# Patient Record
Sex: FEMALE | Race: WHITE | NOT HISPANIC OR LATINO
[De-identification: names, ages, dates, MRNs, and addresses within clinical notes are randomized per-mention and may not be internally consistent; named-entity substitution may affect disease eponyms.]

---

## 2017-12-11 ENCOUNTER — TRANSCRIPTION ENCOUNTER (OUTPATIENT)
Age: 22
End: 2017-12-11

## 2017-12-12 ENCOUNTER — APPOINTMENT (OUTPATIENT)
Dept: SURGERY | Facility: CLINIC | Age: 22
End: 2017-12-12
Payer: COMMERCIAL

## 2017-12-12 VITALS
BODY MASS INDEX: 20.24 KG/M2 | WEIGHT: 110 LBS | DIASTOLIC BLOOD PRESSURE: 78 MMHG | HEIGHT: 62 IN | SYSTOLIC BLOOD PRESSURE: 127 MMHG | HEART RATE: 74 BPM

## 2017-12-12 DIAGNOSIS — Z78.9 OTHER SPECIFIED HEALTH STATUS: ICD-10-CM

## 2017-12-12 DIAGNOSIS — Z80.2 FAMILY HISTORY OF MALIGNANT NEOPLASM OF OTHER RESPIRATORY AND INTRATHORACIC ORGANS: ICD-10-CM

## 2017-12-12 DIAGNOSIS — F41.9 ANXIETY DISORDER, UNSPECIFIED: ICD-10-CM

## 2017-12-12 PROCEDURE — 99244 OFF/OP CNSLTJ NEW/EST MOD 40: CPT

## 2017-12-12 RX ORDER — DULOXETINE HYDROCHLORIDE 20 MG/1
20 CAPSULE, DELAYED RELEASE PELLETS ORAL
Refills: 0 | Status: ACTIVE | COMMUNITY

## 2017-12-13 ENCOUNTER — OUTPATIENT (OUTPATIENT)
Dept: OUTPATIENT SERVICES | Facility: HOSPITAL | Age: 22
LOS: 1 days | End: 2017-12-13

## 2017-12-13 VITALS
TEMPERATURE: 98 F | SYSTOLIC BLOOD PRESSURE: 98 MMHG | WEIGHT: 106.04 LBS | HEART RATE: 78 BPM | HEIGHT: 61.5 IN | DIASTOLIC BLOOD PRESSURE: 70 MMHG | RESPIRATION RATE: 16 BRPM

## 2017-12-13 DIAGNOSIS — E04.1 NONTOXIC SINGLE THYROID NODULE: ICD-10-CM

## 2017-12-13 DIAGNOSIS — L42 PITYRIASIS ROSEA: ICD-10-CM

## 2017-12-13 DIAGNOSIS — N39.0 URINARY TRACT INFECTION, SITE NOT SPECIFIED: ICD-10-CM

## 2017-12-13 LAB
HCT VFR BLD CALC: 42.7 % — SIGNIFICANT CHANGE UP (ref 34.5–45)
HGB BLD-MCNC: 14.3 G/DL — SIGNIFICANT CHANGE UP (ref 11.5–15.5)
MCHC RBC-ENTMCNC: 30 PG — SIGNIFICANT CHANGE UP (ref 27–34)
MCHC RBC-ENTMCNC: 33.5 % — SIGNIFICANT CHANGE UP (ref 32–36)
MCV RBC AUTO: 89.5 FL — SIGNIFICANT CHANGE UP (ref 80–100)
NRBC # FLD: 0 — SIGNIFICANT CHANGE UP
PLATELET # BLD AUTO: 246 K/UL — SIGNIFICANT CHANGE UP (ref 150–400)
PMV BLD: 10.4 FL — SIGNIFICANT CHANGE UP (ref 7–13)
RBC # BLD: 4.77 M/UL — SIGNIFICANT CHANGE UP (ref 3.8–5.2)
RBC # FLD: 12.9 % — SIGNIFICANT CHANGE UP (ref 10.3–14.5)
WBC # BLD: 5.83 K/UL — SIGNIFICANT CHANGE UP (ref 3.8–10.5)
WBC # FLD AUTO: 5.83 K/UL — SIGNIFICANT CHANGE UP (ref 3.8–10.5)

## 2017-12-13 RX ORDER — SODIUM CHLORIDE 9 MG/ML
1000 INJECTION, SOLUTION INTRAVENOUS
Qty: 0 | Refills: 0 | Status: DISCONTINUED | OUTPATIENT
Start: 2017-12-18 | End: 2017-12-19

## 2017-12-13 NOTE — H&P PST ADULT - NSANTHOSAYNRD_GEN_A_CORE
No. DANI screening performed.  STOP BANG Legend: 0-2 = LOW Risk; 3-4 = INTERMEDIATE Risk; 5-8 = HIGH Risk

## 2017-12-13 NOTE — H&P PST ADULT - PROBLEM SELECTOR PLAN 1
Scheduled for Left thyroid lobectomy possible total on 12/18/2017.  Preop instructions provided and pt verbalizes understanding.  Labs done and results pending.  Famotidine and Hibiclens provided with instructions.  Urine specimen cup provided.

## 2017-12-13 NOTE — H&P PST ADULT - HISTORY OF PRESENT ILLNESS
22yr old female with preop dx of nontoxic single thyroid nodule presents to have PST eval for Left thyroid lobectomy possible total scheduled on 12/18/2017.

## 2017-12-13 NOTE — H&P PST ADULT - FAMILY HISTORY
Mother  Still living? Yes, Estimated age: 51-60  Family history of diabetes mellitus, Age at diagnosis: Age Unknown

## 2017-12-13 NOTE — H&P PST ADULT - NEGATIVE ALLERGY TYPES
no reactions to food/no indoor environmental allergies/no reactions to medicines/no reactions to animals

## 2017-12-14 ENCOUNTER — RESULT REVIEW (OUTPATIENT)
Age: 22
End: 2017-12-14

## 2017-12-18 ENCOUNTER — OTHER (OUTPATIENT)
Age: 22
End: 2017-12-18

## 2017-12-18 ENCOUNTER — OUTPATIENT (OUTPATIENT)
Dept: OUTPATIENT SERVICES | Facility: HOSPITAL | Age: 22
LOS: 1 days | Discharge: HOME | End: 2017-12-18
Payer: COMMERCIAL

## 2017-12-18 ENCOUNTER — APPOINTMENT (OUTPATIENT)
Dept: SURGERY | Facility: HOSPITAL | Age: 22
End: 2017-12-18

## 2017-12-18 ENCOUNTER — RESULT REVIEW (OUTPATIENT)
Age: 22
End: 2017-12-18

## 2017-12-18 ENCOUNTER — TRANSCRIPTION ENCOUNTER (OUTPATIENT)
Age: 22
End: 2017-12-18

## 2017-12-18 VITALS
SYSTOLIC BLOOD PRESSURE: 117 MMHG | HEART RATE: 89 BPM | DIASTOLIC BLOOD PRESSURE: 75 MMHG | TEMPERATURE: 98 F | RESPIRATION RATE: 15 BRPM | OXYGEN SATURATION: 98 %

## 2017-12-18 VITALS
WEIGHT: 106.04 LBS | DIASTOLIC BLOOD PRESSURE: 80 MMHG | HEART RATE: 78 BPM | HEIGHT: 61.5 IN | TEMPERATURE: 99 F | OXYGEN SATURATION: 99 % | SYSTOLIC BLOOD PRESSURE: 126 MMHG | RESPIRATION RATE: 14 BRPM

## 2017-12-18 DIAGNOSIS — E04.1 NONTOXIC SINGLE THYROID NODULE: ICD-10-CM

## 2017-12-18 PROCEDURE — 88334 PATH CONSLTJ SURG CYTO XM EA: CPT | Mod: 26,59

## 2017-12-18 PROCEDURE — 88331 PATH CONSLTJ SURG 1 BLK 1SPC: CPT | Mod: 26

## 2017-12-18 PROCEDURE — 88305 TISSUE EXAM BY PATHOLOGIST: CPT | Mod: 26

## 2017-12-18 PROCEDURE — 88307 TISSUE EXAM BY PATHOLOGIST: CPT | Mod: 26

## 2017-12-18 PROCEDURE — 13132 CMPLX RPR F/C/C/M/N/AX/G/H/F: CPT | Mod: 59

## 2017-12-18 PROCEDURE — 60252 REMOVAL OF THYROID: CPT | Mod: AS

## 2017-12-18 PROCEDURE — 60252 REMOVAL OF THYROID: CPT

## 2017-12-18 RX ORDER — AZTREONAM 2 G
1 VIAL (EA) INJECTION
Qty: 0 | Refills: 0 | COMMUNITY

## 2017-12-18 RX ORDER — NORGESTIMATE AND ETHINYL ESTRADIOL 7DAYSX3 LO
1 KIT ORAL
Qty: 0 | Refills: 0 | COMMUNITY

## 2017-12-18 RX ORDER — ACETAMINOPHEN 500 MG
2 TABLET ORAL
Qty: 0 | Refills: 0 | COMMUNITY
Start: 2017-12-18

## 2017-12-18 RX ORDER — OXYCODONE AND ACETAMINOPHEN 5; 325 MG/1; MG/1
1 TABLET ORAL EVERY 4 HOURS
Qty: 0 | Refills: 0 | Status: DISCONTINUED | OUTPATIENT
Start: 2017-12-18 | End: 2017-12-19

## 2017-12-18 RX ORDER — DULOXETINE HYDROCHLORIDE 30 MG/1
1 CAPSULE, DELAYED RELEASE ORAL
Qty: 0 | Refills: 0 | COMMUNITY

## 2017-12-18 RX ORDER — ACETAMINOPHEN 500 MG
650 TABLET ORAL EVERY 6 HOURS
Qty: 0 | Refills: 0 | Status: DISCONTINUED | OUTPATIENT
Start: 2017-12-18 | End: 2017-12-19

## 2017-12-18 RX ADMIN — SODIUM CHLORIDE 30 MILLILITER(S): 9 INJECTION, SOLUTION INTRAVENOUS at 10:18

## 2017-12-18 RX ADMIN — Medication 650 MILLIGRAM(S): at 17:09

## 2017-12-18 RX ADMIN — SODIUM CHLORIDE 50 MILLILITER(S): 9 INJECTION, SOLUTION INTRAVENOUS at 16:00

## 2017-12-18 NOTE — BRIEF OPERATIVE NOTE - PROCEDURE
<<-----Click on this checkbox to enter Procedure Left thyroid lobectomy  12/18/2017    Active  KTORTORELL

## 2017-12-18 NOTE — ASU DISCHARGE PLAN (ADULT/PEDIATRIC). - COMMENTS
Surgical Unit will call you on the next business day to follow up. [facility] [place] is open Monday - Friday.

## 2017-12-18 NOTE — ASU DISCHARGE PLAN (ADULT/PEDIATRIC). - NOTIFY
Inability to Tolerate Liquids or Foods/Bleeding that does not stop/Pain not relieved by Medications/GYN Fever>100.4/Swelling that continues/Persistent Nausea and Vomiting/Unable to Urinate/Fever greater than 101

## 2017-12-18 NOTE — ASU DISCHARGE PLAN (ADULT/PEDIATRIC). - NURSING INSTRUCTIONS
See medication reconcilliation record  You were given an antibiotic ( Cefazolin 2000mg  ) in OR today about 10:30am  You were given IV Tylenol for pain management.  Please DO NOT take tylenol for the next 6-8 hours (after 4:45pm ). Please do not exceed 3000mg in 24hours.

## 2017-12-18 NOTE — ASU DISCHARGE PLAN (ADULT/PEDIATRIC). - ITEMS TO FOLLOWUP WITH YOUR PHYSICIAN'S
Call MD for any neck swelling, any shortness of breath, or any redness/drainage from wound. Stay away from hot, spicy and jagged edged foods.  Call MD for any nasal tip, fingertip or extremity numbness/tingling. Take medications as directed.     After showering pat dry steri strips.  Do Not rub them.  They will curl up and fall off by themselves within 7 days.   Renzo Patel drain instructions done verbally  with skill demonstration done. Written instructions given to patient.

## 2017-12-18 NOTE — ASU DISCHARGE PLAN (ADULT/PEDIATRIC). - INSTRUCTIONS
start with clear liquids and gradually increase your diet as you can, until you return to your normal diet. Call your surgeon's office later today or tomorrow to schedule a follow up appointment.

## 2017-12-18 NOTE — ASU DISCHARGE PLAN (ADULT/PEDIATRIC). - MEDICATION SUMMARY - MEDICATIONS TO TAKE
I will START or STAY ON the medications listed below when I get home from the hospital:    acetaminophen 325 mg oral tablet  -- 2 tab(s) by mouth every 6 hours, As needed, Moderate Pain (4 - 6)  -- Indication: For Nontoxic uninodular goiter    DULoxetine 30 mg oral delayed release capsule  -- 1  by mouth once a day pm  -- Indication: For Nontoxic uninodular goiter    clobetasol 0.05% topical foam  -- Apply on skin to affected area 2 times a day  -- Indication: For Nontoxic uninodular goiter    Estarylla 0.25 mg-35 mcg oral tablet  -- 1 tab(s) by mouth once a day pm  -- Indication: For Nontoxic uninodular goiter    Bactrim  mg-160 mg oral tablet  -- 1 tab(s) by mouth 2 times a day Last 12/17/17  -- Indication: For Nontoxic uninodular goiter

## 2017-12-19 ENCOUNTER — APPOINTMENT (OUTPATIENT)
Dept: SURGERY | Facility: CLINIC | Age: 22
End: 2017-12-19
Payer: COMMERCIAL

## 2017-12-19 PROCEDURE — 99024 POSTOP FOLLOW-UP VISIT: CPT

## 2017-12-19 RX ORDER — SULFAMETHOXAZOLE AND TRIMETHOPRIM 800; 160 MG/1; MG/1
800-160 TABLET ORAL
Qty: 14 | Refills: 0 | Status: COMPLETED | COMMUNITY
Start: 2017-12-11

## 2017-12-21 ENCOUNTER — RESULT REVIEW (OUTPATIENT)
Age: 22
End: 2017-12-21

## 2017-12-22 LAB — SURGICAL PATHOLOGY STUDY: SIGNIFICANT CHANGE UP

## 2017-12-28 ENCOUNTER — APPOINTMENT (OUTPATIENT)
Dept: SURGERY | Facility: CLINIC | Age: 22
End: 2017-12-28
Payer: COMMERCIAL

## 2017-12-28 PROCEDURE — 99024 POSTOP FOLLOW-UP VISIT: CPT

## 2017-12-28 RX ORDER — NORGESTIMATE AND ETHINYL ESTRADIOL 7DAYSX3 LO
0.18/0.215/0.25 KIT ORAL
Qty: 84 | Refills: 0 | Status: ACTIVE | COMMUNITY
Start: 2017-12-04

## 2017-12-28 RX ORDER — CLOBETASOL PROPIONATE 0.5 MG/G
0.05 AEROSOL, FOAM TOPICAL
Qty: 100 | Refills: 0 | Status: COMPLETED | COMMUNITY
Start: 2017-12-06

## 2017-12-28 RX ORDER — TRIAMCINOLONE ACETONIDE 1 MG/G
0.1 CREAM TOPICAL
Qty: 30 | Refills: 0 | Status: COMPLETED | COMMUNITY
Start: 2017-12-04

## 2017-12-28 RX ORDER — SULFACETAMIDE SODIUM, SULFUR 100; 20 MG/G; MG/G
10-2 CREAM TOPICAL
Qty: 57 | Refills: 0 | Status: COMPLETED | COMMUNITY
Start: 2017-10-11

## 2017-12-28 RX ORDER — DULOXETINE HYDROCHLORIDE 30 MG/1
30 CAPSULE, DELAYED RELEASE PELLETS ORAL
Qty: 30 | Refills: 0 | Status: COMPLETED | COMMUNITY
Start: 2017-08-20

## 2018-02-23 ENCOUNTER — OTHER (OUTPATIENT)
Age: 23
End: 2018-02-23

## 2018-05-01 ENCOUNTER — APPOINTMENT (OUTPATIENT)
Dept: SURGERY | Facility: CLINIC | Age: 23
End: 2018-05-01
Payer: COMMERCIAL

## 2018-05-01 PROCEDURE — 36415 COLL VENOUS BLD VENIPUNCTURE: CPT

## 2018-05-01 PROCEDURE — 99213 OFFICE O/P EST LOW 20 MIN: CPT

## 2018-05-03 LAB
24R-OH-CALCIDIOL SERPL-MCNC: 50.4 PG/ML
CALCIUM SERPL-MCNC: 9.6 MG/DL
CALCIUM SERPL-MCNC: 9.6 MG/DL
PARATHYROID HORMONE INTACT: 28 PG/ML
T3 SERPL-MCNC: 151 NG/DL
T4 FREE SERPL-MCNC: 1.2 NG/DL
THYROGLOB AB SERPL-ACNC: 90 IU/ML
THYROPEROXIDASE AB SERPL IA-ACNC: <10 IU/ML
TSH SERPL-ACNC: 3.15 UIU/ML

## 2018-05-14 ENCOUNTER — OTHER (OUTPATIENT)
Age: 23
End: 2018-05-14

## 2018-12-24 ENCOUNTER — RESULT REVIEW (OUTPATIENT)
Age: 23
End: 2018-12-24

## 2018-12-27 ENCOUNTER — APPOINTMENT (OUTPATIENT)
Dept: SURGERY | Facility: CLINIC | Age: 23
End: 2018-12-27
Payer: COMMERCIAL

## 2018-12-27 PROBLEM — N39.0 URINARY TRACT INFECTION, SITE NOT SPECIFIED: Chronic | Status: ACTIVE | Noted: 2017-12-13

## 2018-12-27 PROBLEM — F41.9 ANXIETY DISORDER, UNSPECIFIED: Chronic | Status: ACTIVE | Noted: 2017-12-13

## 2018-12-27 PROBLEM — L42 PITYRIASIS ROSEA: Chronic | Status: ACTIVE | Noted: 2017-12-13

## 2018-12-27 PROCEDURE — 99213 OFFICE O/P EST LOW 20 MIN: CPT

## 2019-04-03 ENCOUNTER — OTHER (OUTPATIENT)
Age: 24
End: 2019-04-03

## 2019-05-02 ENCOUNTER — LABORATORY RESULT (OUTPATIENT)
Age: 24
End: 2019-05-02

## 2019-05-02 ENCOUNTER — APPOINTMENT (OUTPATIENT)
Dept: SURGERY | Facility: CLINIC | Age: 24
End: 2019-05-02
Payer: COMMERCIAL

## 2019-05-02 PROCEDURE — 99213 OFFICE O/P EST LOW 20 MIN: CPT | Mod: 25

## 2019-05-02 PROCEDURE — 31575 DIAGNOSTIC LARYNGOSCOPY: CPT

## 2019-05-02 PROCEDURE — 36415 COLL VENOUS BLD VENIPUNCTURE: CPT

## 2019-05-02 NOTE — HISTORY OF PRESENT ILLNESS
[de-identified] : 1 1/2  years  s/p thyroid lobectomy for malignancy. feels well on no Synthroid.   denies dysphagia, hoarseness or new lesions. no changes medically since last visit. recent sonogram NOEL.  has been taking vitamin D 5000 units daily.  recent TFT's show TSH elevated but 1 month prior was normal.  notes daily abnormal throat sensation and difficulty catching breath

## 2019-05-02 NOTE — PHYSICAL EXAM
[de-identified] : no palpable thyroid nodules [de-identified] : well healed scar [Laryngoscopy Performed] : laryngoscopy was performed, see procedure section for findings [Midline] : located in midline position [Normal] : orientation to person, place, and time: normal

## 2019-05-02 NOTE — PROCEDURE
[None] : none [Flexible Endoscope] : examined with the flexible endoscope [Lesion(s)] : no lesions [de-identified] : arytenoid erythema [Normal] : the epiglottis was regular without inflammation, lesions or masses, with regular aryepiglottic folds, and a smooth petiolus

## 2019-05-02 NOTE — ASSESSMENT
[FreeTextEntry1] : will observe.  bloods drawn. to call next week for results. suspect GERD. instructed to avoid caffeine and other irritants and to use antacids. . to return earlier if any change.

## 2019-05-04 LAB
T3 SERPL-MCNC: 120 NG/DL
T4 FREE SERPL-MCNC: 1 NG/DL
THYROGLOB AB SERPL-ACNC: 21.5 IU/ML
THYROGLOB SERPL-MCNC: 13 NG/ML
TSH SERPL-ACNC: 3.58 UIU/ML

## 2019-05-07 ENCOUNTER — OTHER (OUTPATIENT)
Age: 24
End: 2019-05-07

## 2019-12-10 ENCOUNTER — OTHER (OUTPATIENT)
Age: 24
End: 2019-12-10

## 2019-12-23 ENCOUNTER — RESULT REVIEW (OUTPATIENT)
Age: 24
End: 2019-12-23

## 2019-12-26 ENCOUNTER — APPOINTMENT (OUTPATIENT)
Dept: SURGERY | Facility: CLINIC | Age: 24
End: 2019-12-26
Payer: COMMERCIAL

## 2019-12-26 PROCEDURE — 99213 OFFICE O/P EST LOW 20 MIN: CPT

## 2019-12-26 NOTE — HISTORY OF PRESENT ILLNESS
[de-identified] : 2  years  s/p thyroid lobectomy for malignancy. feels well on no Synthroid  but notes occasional light headed ness.   denies dysphagia, hoarseness or new lesions. no changes medically since last visit. recent sonogram NOEL.  has been taking vitamin D 5000 units daily.  recent TFT's show TSH elevated

## 2019-12-26 NOTE — PHYSICAL EXAM
[de-identified] : well healed scar [de-identified] : no palpable thyroid nodules [Laryngoscopy Performed] : laryngoscopy was performed, see procedure section for findings [Midline] : located in midline position [Normal] : cranial nerves 2-12 intact

## 2019-12-26 NOTE — PROCEDURE
[None] : none [Flexible Endoscope] : examined with the flexible endoscope [Normal] : the epiglottis was regular without inflammation, lesions or masses, with regular aryepiglottic folds, and a smooth petiolus [Lesion(s)] : no lesions [de-identified] : arytenoid erythema

## 2019-12-26 NOTE — ASSESSMENT
[FreeTextEntry1] : will observe. will start Synthroid 25 mcg daily.  bloods requested 6 weeks. to call next week for results. . to return earlier if any change.

## 2020-05-01 ENCOUNTER — APPOINTMENT (OUTPATIENT)
Dept: SURGERY | Facility: CLINIC | Age: 25
End: 2020-05-01
Payer: COMMERCIAL

## 2020-05-01 PROCEDURE — 99213 OFFICE O/P EST LOW 20 MIN: CPT | Mod: 95

## 2020-05-01 RX ORDER — LEVOTHYROXINE SODIUM 0.07 MG/1
75 TABLET ORAL
Qty: 90 | Refills: 2 | Status: ACTIVE | COMMUNITY
Start: 2020-05-01 | End: 1900-01-01

## 2020-05-01 NOTE — ASSESSMENT
[FreeTextEntry1] : will observe. will increase to  Synthroid 75 mcg daily.  bloods requested 4 weeks. sonogram later this month.  to call next week for results. . to return earlier if any change.  was given the opportunity to ask questions, and all of her questions were answered to her satisfaction

## 2020-05-01 NOTE — REASON FOR VISIT
[Other Location: e.g. Home (Enter Location, City,State)___] : at [unfilled] [Home] : at home, [unfilled] , at the time of the visit. [Patient] : the patient

## 2020-11-03 ENCOUNTER — APPOINTMENT (OUTPATIENT)
Dept: SURGERY | Facility: CLINIC | Age: 25
End: 2020-11-03

## 2020-11-19 ENCOUNTER — APPOINTMENT (OUTPATIENT)
Dept: SURGERY | Facility: CLINIC | Age: 25
End: 2020-11-19
Payer: COMMERCIAL

## 2020-11-19 PROCEDURE — 99442: CPT

## 2020-11-19 NOTE — HISTORY OF PRESENT ILLNESS
[de-identified] : t/c from pt inquiring about telehealth to avoid face to face visit. before the visit, the patient was informed about the limitations and ramifications of telehealth and the potential for HIPAA non compliance.  the patient was at home and no other participants in the call.  the patient then agreed to proceed with the visit.  visit performed thru [facility].\par 3  years  s/p thyroid lobectomy for malignancy. feels well on  Synthroid  75 mcg daily.  denies dysphagia, hoarseness or new lesions. no changes medically since last visit. last sonogram NOEL.  has been taking vitamin D 5000 units daily.  recent TFT's normal.  notes occasional hot flashes

## 2020-11-19 NOTE — REASON FOR VISIT
[Home] : at home, [unfilled] , at the time of the visit. [Medical Office: ([facility])___] : at the medical office located in  [Verbal consent obtained from patient] : the patient, [unfilled] [Follow-Up: _____] : a [unfilled] follow-up visit

## 2020-11-19 NOTE — ASSESSMENT
[FreeTextEntry1] : will observe. sonogram next visit. . to return earlier if any change.  was given the opportunity to ask questions, and all of her questions were answered to her satisfaction. recommended discussion of hot flashes with GYN

## 2021-05-19 ENCOUNTER — NON-APPOINTMENT (OUTPATIENT)
Age: 26
End: 2021-05-19

## 2021-05-20 ENCOUNTER — NON-APPOINTMENT (OUTPATIENT)
Age: 26
End: 2021-05-20

## 2021-05-20 ENCOUNTER — APPOINTMENT (OUTPATIENT)
Dept: SURGERY | Facility: CLINIC | Age: 26
End: 2021-05-20
Payer: COMMERCIAL

## 2021-05-20 PROCEDURE — 99442: CPT

## 2021-05-20 NOTE — HISTORY OF PRESENT ILLNESS
[de-identified] : t/c from pt inquiring about telehealth to avoid face to face visit. before the visit, the patient was informed about the limitations and ramifications of telehealth and the potential for HIPAA non compliance.  the patient was at home and no other participants in the call.  the patient then agreed to proceed with the visit.  visit performed thru [facility].\par 3  1/2 years  s/p thyroid lobectomy for malignancy. feels well on  Synthroid  75 mcg daily.  denies dysphagia, hoarseness or new lesions. no changes medically since last visit. recent sonogram NOEL.  recent TFT's normal.  notes occasional hot flashes - workup has not shown any cause per GYN. I have reviewed all old and new data and available images.\par

## 2021-05-20 NOTE — ASSESSMENT
[FreeTextEntry1] : will observe. sonogram next visit. . to return earlier if any change.  was given the opportunity to ask questions, and all of her questions were answered to her satisfaction. recommended discussion of changing BCP with GYN

## 2021-05-20 NOTE — REASON FOR VISIT
[Follow-Up: _____] : a [unfilled] follow-up visit [Home] : at home, [unfilled] , at the time of the visit. [Medical Office: ([facility])___] : at the medical office located in  [Verbal consent obtained from patient] : the patient, [unfilled]

## 2021-05-21 ENCOUNTER — RX RENEWAL (OUTPATIENT)
Age: 26
End: 2021-05-21

## 2022-01-04 ENCOUNTER — APPOINTMENT (OUTPATIENT)
Dept: SURGERY | Facility: CLINIC | Age: 27
End: 2022-01-04
Payer: COMMERCIAL

## 2022-01-04 ENCOUNTER — LABORATORY RESULT (OUTPATIENT)
Age: 27
End: 2022-01-04

## 2022-01-04 PROCEDURE — 36415 COLL VENOUS BLD VENIPUNCTURE: CPT

## 2022-01-04 PROCEDURE — 99214 OFFICE O/P EST MOD 30 MIN: CPT

## 2022-01-04 NOTE — HISTORY OF PRESENT ILLNESS
[de-identified] : 4  years  s/p thyroid lobectomy for malignancy. feels well on  Synthroid 75 mcg daily.    denies dysphagia, hoarseness or new lesions. no changes medically since last visit. recent sonogram NOEL. I have reviewed all old and new data and available images.\par

## 2022-01-04 NOTE — PROCEDURE
[None] : none [Flexible Endoscope] : examined with the flexible endoscope [Lesion(s)] : no lesions [Normal] : the epiglottis was regular without inflammation, lesions or masses, with regular aryepiglottic folds, and a smooth petiolus [de-identified] : arytenoid erythema

## 2022-01-04 NOTE — PHYSICAL EXAM
[de-identified] : well healed scar [de-identified] : no palpable thyroid nodules [Laryngoscopy Performed] : laryngoscopy was performed, see procedure section for findings [Midline] : located in midline position [Normal] : orientation to person, place, and time: normal

## 2022-01-04 NOTE — ASSESSMENT
[FreeTextEntry1] : will observe. sonogram next visit.   bloods drawn. to call next week for results. . to return earlier if any change.  patient has been given the opportunity to ask questions, and all of the patient's questions have been answered to their satisfaction\par

## 2022-01-05 LAB
T3 SERPL-MCNC: 104 NG/DL
T4 FREE SERPL-MCNC: 1.2 NG/DL
TSH SERPL-ACNC: 0.88 UIU/ML

## 2022-01-06 ENCOUNTER — NON-APPOINTMENT (OUTPATIENT)
Age: 27
End: 2022-01-06

## 2022-01-06 LAB
THYROGLOB AB SERPL-ACNC: <20 IU/ML
THYROGLOB SERPL-MCNC: 6.41 NG/ML

## 2022-02-22 ENCOUNTER — RX RENEWAL (OUTPATIENT)
Age: 27
End: 2022-02-22

## 2022-05-23 ENCOUNTER — RX RENEWAL (OUTPATIENT)
Age: 27
End: 2022-05-23

## 2022-11-09 ENCOUNTER — RX RENEWAL (OUTPATIENT)
Age: 27
End: 2022-11-09

## 2023-01-17 NOTE — HISTORY OF PRESENT ILLNESS
[de-identified] : t/c from pt inquiring about telehealth to avoid face to face visit. before the visit, the patient was informed about the limitations and ramifications of telehealth and the potential for HIPAA non compliance.  the patient was at home and no other participants in the call.  the patient then agreed to proceed with the visit.  visit performed thru [facility].\par 2 1/2  years  s/p thyroid lobectomy for malignancy. feels well on  Synthroid  50 mcg daily.  denies dysphagia, hoarseness or new lesions. no changes medically since last visit. last sonogram NOEL.  has been taking vitamin D 5000 units daily.  recent TFT's show TSH decreased to 3 WDL Tinetti Score=   10/28

## 2023-02-14 ENCOUNTER — APPOINTMENT (OUTPATIENT)
Dept: SURGERY | Facility: CLINIC | Age: 28
End: 2023-02-14
Payer: COMMERCIAL

## 2023-02-14 ENCOUNTER — LABORATORY RESULT (OUTPATIENT)
Age: 28
End: 2023-02-14

## 2023-02-14 DIAGNOSIS — Z00.00 ENCOUNTER FOR GENERAL ADULT MEDICAL EXAMINATION W/OUT ABNORMAL FINDINGS: ICD-10-CM

## 2023-02-14 PROCEDURE — 36415 COLL VENOUS BLD VENIPUNCTURE: CPT

## 2023-02-14 PROCEDURE — 99214 OFFICE O/P EST MOD 30 MIN: CPT

## 2023-02-14 NOTE — PROCEDURE
[None] : none [Mass ___ cm] : no masses on the base of the tongue [Lesion(s)] : no lesions [Normal] : the epiglottis was regular without inflammation, lesions or masses, with regular aryepiglottic folds, and a smooth petiolus

## 2023-02-14 NOTE — PHYSICAL EXAM
[de-identified] : well healed scar [de-identified] : no palpable thyroid nodules [Laryngoscopy Performed] : laryngoscopy was performed, see procedure section for findings [Midline] : located in midline position [Normal] : orientation to person, place, and time: normal

## 2023-02-14 NOTE — HISTORY OF PRESENT ILLNESS
[de-identified] : 5  years  s/p thyroid lobectomy for malignancy. feels well on  Synthroid 75 mcg daily.    denies dysphagia, hoarseness or new lesions. no changes medically since last visit. recent sonogram NOEL. I have reviewed all old and new data and available images.\par

## 2023-02-15 LAB
CALCIUM SERPL-MCNC: 9.3 MG/DL
CALCIUM SERPL-MCNC: 9.3 MG/DL
PARATHYROID HORMONE INTACT: 38 PG/ML
T3 SERPL-MCNC: 124 NG/DL
T4 FREE SERPL-MCNC: 1.1 NG/DL
THYROGLOB AB SERPL-ACNC: <20 IU/ML
THYROGLOB SERPL-MCNC: 1.37 NG/ML
TSH SERPL-ACNC: 1.08 UIU/ML

## 2023-02-21 ENCOUNTER — NON-APPOINTMENT (OUTPATIENT)
Age: 28
End: 2023-02-21

## 2023-03-21 RX ORDER — LEVOTHYROXINE SODIUM 0.03 MG/1
25 TABLET ORAL DAILY
Qty: 90 | Refills: 2 | Status: COMPLETED | COMMUNITY
Start: 2019-12-26 | End: 2023-03-21

## 2023-03-21 RX ORDER — LEVOTHYROXINE SODIUM 0.05 MG/1
50 TABLET ORAL DAILY
Qty: 90 | Refills: 1 | Status: COMPLETED | COMMUNITY
Start: 2020-02-13 | End: 2023-03-21

## 2023-03-31 ENCOUNTER — NON-APPOINTMENT (OUTPATIENT)
Age: 28
End: 2023-03-31

## 2023-05-05 ENCOUNTER — TRANSCRIPTION ENCOUNTER (OUTPATIENT)
Age: 28
End: 2023-05-05

## 2023-06-20 ENCOUNTER — RX RENEWAL (OUTPATIENT)
Age: 28
End: 2023-06-20

## 2023-09-15 ENCOUNTER — RX RENEWAL (OUTPATIENT)
Age: 28
End: 2023-09-15

## 2023-09-15 RX ORDER — LEVOTHYROXINE SODIUM 0.07 MG/1
75 TABLET ORAL DAILY
Qty: 90 | Refills: 1 | Status: ACTIVE | COMMUNITY
Start: 2020-06-10 | End: 1900-01-01

## 2024-02-26 ENCOUNTER — RX RENEWAL (OUTPATIENT)
Age: 29
End: 2024-02-26

## 2024-02-26 RX ORDER — LEVOTHYROXINE SODIUM 0.07 MG/1
75 TABLET ORAL
Qty: 90 | Refills: 1 | Status: ACTIVE | COMMUNITY
Start: 2023-11-30 | End: 1900-01-01

## 2024-04-04 ENCOUNTER — APPOINTMENT (OUTPATIENT)
Dept: SURGERY | Facility: CLINIC | Age: 29
End: 2024-04-04
Payer: COMMERCIAL

## 2024-04-04 DIAGNOSIS — C73 MALIGNANT NEOPLASM OF THYROID GLAND: ICD-10-CM

## 2024-04-04 PROCEDURE — 99214 OFFICE O/P EST MOD 30 MIN: CPT

## 2024-04-04 NOTE — HISTORY OF PRESENT ILLNESS
[de-identified] : 6 years  s/p thyroid lobectomy for malignancy. feels well on  Synthroid 75 mcg daily.    denies dysphagia, hoarseness or new lesions. no changes medically since last visit. recent sonogram NOEL. recent TFTs normal. I have reviewed all old and new data and available images.  engaged to be  next year

## 2024-04-04 NOTE — ASSESSMENT
[FreeTextEntry1] : will observe. sonogram and bloods next visit.    to return earlier if any change.  patient has been given the opportunity to ask questions, and all of the patient's questions have been answered to their satisfaction

## 2024-04-04 NOTE — PHYSICAL EXAM
[de-identified] : well healed scar [de-identified] : no palpable thyroid nodules [Laryngoscopy Performed] : laryngoscopy was performed, see procedure section for findings [Midline] : located in midline position [Normal] : orientation to person, place, and time: normal

## 2024-08-05 ENCOUNTER — RX RENEWAL (OUTPATIENT)
Age: 29
End: 2024-08-05

## 2025-04-30 NOTE — H&P PST ADULT - PROBLEM/PLAN-2
Detail Level: Zone Render In Strict Bullet Format?: No Initiate Treatment: tretinoin 0.05 % topical cream \\nQuantity: 20.0 g  Days Supply: 30\\nSig: Apply pea size amount to face at bedtime. Rinse off in AM DISPLAY PLAN FREE TEXT